# Patient Record
Sex: FEMALE | Race: WHITE | NOT HISPANIC OR LATINO | Employment: FULL TIME | RURAL
[De-identification: names, ages, dates, MRNs, and addresses within clinical notes are randomized per-mention and may not be internally consistent; named-entity substitution may affect disease eponyms.]

---

## 2019-01-10 ENCOUNTER — HISTORICAL (OUTPATIENT)
Dept: ADMINISTRATIVE | Facility: HOSPITAL | Age: 54
End: 2019-01-10

## 2019-01-14 LAB
LAB AP GENERAL CAT - HISTORICAL: NORMAL
LAB AP INTERPRETATION/RESULT - HISTORICAL: NEGATIVE
LAB AP SPECIMEN ADEQUACY - HISTORICAL: NORMAL
LAB AP SPECIMEN SUBMITTED - HISTORICAL: NORMAL

## 2021-03-08 ENCOUNTER — HISTORICAL (OUTPATIENT)
Dept: ADMINISTRATIVE | Facility: HOSPITAL | Age: 56
End: 2021-03-08

## 2021-03-08 LAB — GLUCOSE SERPL-MCNC: 111 MG/DL (ref 70–105)

## 2021-03-09 LAB

## 2021-08-17 ENCOUNTER — OFFICE VISIT (OUTPATIENT)
Dept: INTERNAL MEDICINE | Facility: CLINIC | Age: 56
End: 2021-08-17
Payer: COMMERCIAL

## 2021-08-17 VITALS
WEIGHT: 231 LBS | SYSTOLIC BLOOD PRESSURE: 92 MMHG | DIASTOLIC BLOOD PRESSURE: 58 MMHG | HEART RATE: 72 BPM | RESPIRATION RATE: 16 BRPM | OXYGEN SATURATION: 98 %

## 2021-08-17 DIAGNOSIS — E78.5 HYPERLIPIDEMIA, UNSPECIFIED HYPERLIPIDEMIA TYPE: ICD-10-CM

## 2021-08-17 DIAGNOSIS — I10 HYPERTENSION, UNSPECIFIED TYPE: ICD-10-CM

## 2021-08-17 DIAGNOSIS — E11.9 CONTROLLED TYPE 2 DIABETES MELLITUS WITHOUT COMPLICATION, WITHOUT LONG-TERM CURRENT USE OF INSULIN: Primary | ICD-10-CM

## 2021-08-17 PROCEDURE — 99213 OFFICE O/P EST LOW 20 MIN: CPT | Mod: S$PBB,,, | Performed by: NURSE PRACTITIONER

## 2021-08-17 PROCEDURE — 99213 OFFICE O/P EST LOW 20 MIN: CPT | Mod: PBBFAC | Performed by: NURSE PRACTITIONER

## 2021-08-17 PROCEDURE — 99213 PR OFFICE/OUTPT VISIT, EST, LEVL III, 20-29 MIN: ICD-10-PCS | Mod: S$PBB,,, | Performed by: NURSE PRACTITIONER

## 2021-08-17 RX ORDER — LISINOPRIL AND HYDROCHLOROTHIAZIDE 10; 12.5 MG/1; MG/1
TABLET ORAL
COMMUNITY
Start: 2021-08-15 | End: 2021-09-09 | Stop reason: SDUPTHER

## 2021-08-17 RX ORDER — DAPAGLIFLOZIN 10 MG/1
TABLET, FILM COATED ORAL
COMMUNITY
Start: 2021-06-21 | End: 2021-09-09 | Stop reason: SDUPTHER

## 2021-08-17 RX ORDER — SIMVASTATIN 20 MG/1
TABLET, FILM COATED ORAL
COMMUNITY
Start: 2021-08-15 | End: 2021-09-09 | Stop reason: SDUPTHER

## 2021-09-09 ENCOUNTER — OFFICE VISIT (OUTPATIENT)
Dept: DIABETES SERVICES | Facility: CLINIC | Age: 56
End: 2021-09-09
Payer: COMMERCIAL

## 2021-09-09 VITALS
BODY MASS INDEX: 36 KG/M2 | DIASTOLIC BLOOD PRESSURE: 66 MMHG | OXYGEN SATURATION: 98 % | WEIGHT: 229.38 LBS | SYSTOLIC BLOOD PRESSURE: 106 MMHG | HEART RATE: 71 BPM | RESPIRATION RATE: 16 BRPM | HEIGHT: 67 IN

## 2021-09-09 DIAGNOSIS — E11.9 TYPE 2 DIABETES MELLITUS WITHOUT COMPLICATION, WITHOUT LONG-TERM CURRENT USE OF INSULIN: Primary | ICD-10-CM

## 2021-09-09 DIAGNOSIS — E78.5 HYPERLIPIDEMIA, UNSPECIFIED HYPERLIPIDEMIA TYPE: ICD-10-CM

## 2021-09-09 LAB
GLUCOSE SERPL-MCNC: 100 MG/DL (ref 70–110)
HBA1C MFR BLD: 5.1 % (ref 4.5–6.6)

## 2021-09-09 PROCEDURE — 99214 OFFICE O/P EST MOD 30 MIN: CPT | Mod: PBBFAC | Performed by: NURSE PRACTITIONER

## 2021-09-09 PROCEDURE — 99214 OFFICE O/P EST MOD 30 MIN: CPT | Mod: S$PBB,,, | Performed by: NURSE PRACTITIONER

## 2021-09-09 PROCEDURE — 99214 PR OFFICE/OUTPT VISIT, EST, LEVL IV, 30-39 MIN: ICD-10-PCS | Mod: S$PBB,,, | Performed by: NURSE PRACTITIONER

## 2021-09-09 PROCEDURE — 83036 HEMOGLOBIN GLYCOSYLATED A1C: CPT | Mod: PBBFAC | Performed by: NURSE PRACTITIONER

## 2021-09-09 PROCEDURE — 82962 GLUCOSE BLOOD TEST: CPT | Mod: PBBFAC | Performed by: NURSE PRACTITIONER

## 2021-09-09 RX ORDER — DAPAGLIFLOZIN 10 MG/1
10 TABLET, FILM COATED ORAL DAILY
Qty: 90 TABLET | Refills: 3 | Status: SHIPPED | OUTPATIENT
Start: 2021-09-09 | End: 2022-11-02

## 2021-09-09 RX ORDER — SIMVASTATIN 20 MG/1
20 TABLET, FILM COATED ORAL NIGHTLY
Qty: 90 TABLET | Refills: 3 | Status: SHIPPED | OUTPATIENT
Start: 2021-09-09 | End: 2022-02-16 | Stop reason: SDUPTHER

## 2021-09-09 RX ORDER — LISINOPRIL AND HYDROCHLOROTHIAZIDE 10; 12.5 MG/1; MG/1
1 TABLET ORAL DAILY
Qty: 90 TABLET | Refills: 3 | Status: SHIPPED | OUTPATIENT
Start: 2021-09-09 | End: 2022-02-16 | Stop reason: SDUPTHER

## 2022-01-14 ENCOUNTER — OFFICE VISIT (OUTPATIENT)
Dept: FAMILY MEDICINE | Facility: CLINIC | Age: 57
End: 2022-01-14
Payer: COMMERCIAL

## 2022-01-14 VITALS
TEMPERATURE: 99 F | OXYGEN SATURATION: 96 % | SYSTOLIC BLOOD PRESSURE: 110 MMHG | RESPIRATION RATE: 18 BRPM | HEART RATE: 76 BPM | DIASTOLIC BLOOD PRESSURE: 50 MMHG

## 2022-01-14 DIAGNOSIS — R11.0 NAUSEA: ICD-10-CM

## 2022-01-14 DIAGNOSIS — U07.1 COVID-19: ICD-10-CM

## 2022-01-14 DIAGNOSIS — J02.9 SORE THROAT: Primary | ICD-10-CM

## 2022-01-14 LAB
CTP QC/QA: YES
S PYO RRNA THROAT QL PROBE: NEGATIVE

## 2022-01-14 PROCEDURE — 99213 PR OFFICE/OUTPT VISIT, EST, LEVL III, 20-29 MIN: ICD-10-PCS | Mod: ,,, | Performed by: NURSE PRACTITIONER

## 2022-01-14 PROCEDURE — 87880 POCT RAPID STREP A: ICD-10-PCS | Mod: QW,,, | Performed by: NURSE PRACTITIONER

## 2022-01-14 PROCEDURE — 87880 STREP A ASSAY W/OPTIC: CPT | Mod: QW,,, | Performed by: NURSE PRACTITIONER

## 2022-01-14 PROCEDURE — 99213 OFFICE O/P EST LOW 20 MIN: CPT | Mod: ,,, | Performed by: NURSE PRACTITIONER

## 2022-01-14 RX ORDER — ONDANSETRON 4 MG/1
4 TABLET, ORALLY DISINTEGRATING ORAL EVERY 8 HOURS PRN
Qty: 12 TABLET | Refills: 0 | Status: SHIPPED | OUTPATIENT
Start: 2022-01-14 | End: 2022-09-12

## 2022-01-14 NOTE — PATIENT INSTRUCTIONS
Strep negative, will try magic mouthwash for pain zofran for nausea  Childrens ibuprofen for pain      Covid Medication List          Vitamin D 5,000 units twice a day     Zinc 50 mg twice a day     Pepcid 20 mg once a day     Zyrtec 10 mg once a day     Aspirin 325 mg once a day     Vitamin C 1000 mg twice a day     Melatonin 3 mg at bedtime     Increase fluid intake and rest!      Stay home until:    At least 5 days have passed since your symptoms began (or since your positive test, if you have no symptoms),    AND you have no more symptoms, or your symptoms are improving and you have no fever and do not need fever-reducing medication such as Tylenol (acetaminophen) or Advil (ibuprofen).    Remain home after 5 days as long as you have a fever. Remaining at home is important to prevent transmitting infection to others.      The [place] Department of Health ([facility]) recommends the following after  exposure to a COVID-19 infected person:  Any exposed individual who develops symptoms should be tested and stay home.  If you have had a booster shot OR you have been fully vaccinated but are not yet eligible for  a booster because of timing*, you should:  ? Wear a mask around others for 10 days.  ? Test on day 5, if possible.  ? If you develop symptoms get a test and stay home.  *Applies to individuals who completed the primary series of Pfizer or Moderna vaccine within the last 6  months OR completed the primary series of J&J vaccine within the last 2 months  If you are unvaccinated OR are currently eligible for a booster dose but have not yet received  one* you should:  ? Stay home for 5 days. After that continue to wear a mask around others for 5 additional  days.  ? If you cant quarantine you must wear a mask for 10 days.  ? Test on day 5 if possible.  ? If you develop symptoms get a test and stay home  *Applies to individuals who completed the primary series of Pfizer or Moderna vaccine over 6 months  ago  and are not boosted OR completed the primary series of J&J over 2 months ago and are not  boosted  Additional considerations:  ? Please let your provider know you have been exposed if you do go in for testing.  ? If you live in a household with a person who is a confirmed case of COVID-19, your last  exposure is when is when you last had contact less than 6 feet for 15 minutes or more.  ? During the 5 day quarantine at home you may be allowed to continue to work if your employer  says you are essential, and you continue to have no symptoms, undergo symptom and  temperature monitoring by your facility and wear a mask while you are at work and around  others. Contact your employer for approval.  o If you do return to work, you should continue to self-quarantine at home at all other  times.  Official CDC Update on Isolation and Quarantine can be found at  https://www.cdc.gov/media/releases/2021/s1227-isolation-quarantine-guidance.html

## 2022-01-14 NOTE — LETTER
January 14, 2022      [facility] [address] [place] [address]  Phone: [phone]  Fax: [phone]       Patient: Megan Trotter   YOB: 1965  Date of Visit: 01/14/2022    To Whom It May Concern:    Corby Trotter  was at [facility] on 01/14/2022. The patient may return to work/school on 1/19/2022 if fever/symptom free x24 hours without medication, continue to wear a mask 5 days after returning. If you have any questions or concerns, or if I can be of further assistance, please do not hesitate to contact me. Excused starting 1/12/2022.    Sincerely,    Monique AVITIA; Yanci Castellon RN

## 2022-01-14 NOTE — PROGRESS NOTES
2022     SADI Gamboa   [facility]  [address]  [place] [address]     PATIENT NAME: Megan Trotter  : 1965  DATE: 22  MRN: [id number]      Billing Provider: SADI Gamboa  Level of Service:   Patient PCP Information     Provider PCP Type    Primary Doctor No General          Reason for Visit / Chief Complaint: No chief complaint on file.       Update PCP  Update Chief Complaint         History of Present Illness / Problem Focused Workflow     Megan Trotter presents to the clinic sore throat fever tested positive on covid home test but very painful to swallow not eating or drinking well      Review of Systems     Review of Systems   Constitutional: Positive for appetite change, chills, fatigue and fever.   HENT: Positive for nasal congestion, sore throat, trouble swallowing and voice change.    Eyes: Negative for visual disturbance.   Respiratory: Negative for cough and wheezing.    Cardiovascular: Negative for chest pain and palpitations.   Gastrointestinal: Positive for nausea. Negative for abdominal pain, change in bowel habit, diarrhea, vomiting and change in bowel habit.   Genitourinary: Negative for difficulty urinating.   Musculoskeletal: Positive for myalgias.   Integumentary:  Negative for pallor and rash.   Neurological: Positive for weakness and headaches.   Hematological: Negative for adenopathy.   Psychiatric/Behavioral: Negative for confusion.        Medical / Social / Family History     Past Medical History:   Diagnosis Date    Diabetes mellitus, type 2     Hyperlipidemia        Past Surgical History:   Procedure Laterality Date    breast biopsy       SECTION      exploratory laprascopic      HERNIA REPAIR      HYSTERECTOMY         Social History  Ms.  reports that she has quit smoking. She has never used smokeless tobacco. She reports previous alcohol use. She reports that she does not use drugs.    Family  History  Ms.'s family history includes Asthma in her mother; Heart disease in her mother; Hypertension in her father; No Known Problems in her sister and sister.    Medications and Allergies     Medications  No outpatient medications have been marked as taking for the 1/14/22 encounter (Appointment) with SADI Gamboa.       Allergies  Review of patient's allergies indicates:   Allergen Reactions    Pcn [penicillins]        Physical Examination   There were no vitals filed for this visit.   Physical Exam  Vitals and nursing note reviewed.   Constitutional:       General: She is not in acute distress.     Appearance: Normal appearance. She is obese. She is not ill-appearing, toxic-appearing or diaphoretic.   HENT:      Head: Normocephalic.      Right Ear: External ear normal.      Left Ear: External ear normal.      Nose: Rhinorrhea present.      Mouth/Throat:      Mouth: Mucous membranes are moist.      Pharynx: No oropharyngeal exudate or posterior oropharyngeal erythema.      Comments: Hoarse voice  Eyes:      Pupils: Pupils are equal, round, and reactive to light.      Comments: injected   Cardiovascular:      Rate and Rhythm: Normal rate and regular rhythm.      Heart sounds: Normal heart sounds.   Pulmonary:      Effort: Pulmonary effort is normal.      Breath sounds: Normal breath sounds.   Musculoskeletal:      Cervical back: Normal range of motion and neck supple. No rigidity or tenderness.   Lymphadenopathy:      Cervical: No cervical adenopathy.   Skin:     General: Skin is warm.      Capillary Refill: Capillary refill takes less than 2 seconds.   Neurological:      Mental Status: She is alert and oriented to person, place, and time.   Psychiatric:         Behavior: Behavior normal.          Assessment and Plan (including Health Maintenance)      Problem List  Smart Sets  Document Outside HM   :    Plan:   Strep negative, will try magic mouthwash for pain zofran for nausea  Childrens  ibuprofen for pain      Covid Medication List          Vitamin D 5,000 units twice a day     Zinc 50 mg twice a day     Pepcid 20 mg once a day     Zyrtec 10 mg once a day     Aspirin 325 mg once a day     Vitamin C 1000 mg twice a day     Melatonin 3 mg at bedtime     Increase fluid intake and rest!      Stay home until:    At least 5 days have passed since your symptoms began (or since your positive test, if you have no symptoms),    AND you have no more symptoms, or your symptoms are improving and you have no fever and do not need fever-reducing medication such as Tylenol (acetaminophen) or Advil (ibuprofen).    Remain home after 5 days as long as you have a fever. Remaining at home is important to prevent transmitting infection to others.      The [place] Department of Health ([facility]) recommends the following after  exposure to a COVID-19 infected person:  Any exposed individual who develops symptoms should be tested and stay home.  If you have had a booster shot OR you have been fully vaccinated but are not yet eligible for  a booster because of timing*, you should:  ? Wear a mask around others for 10 days.  ? Test on day 5, if possible.  ? If you develop symptoms get a test and stay home.  *Applies to individuals who completed the primary series of Pfizer or Moderna vaccine within the last 6  months OR completed the primary series of J&J vaccine within the last 2 months  If you are unvaccinated OR are currently eligible for a booster dose but have not yet received  one* you should:  ? Stay home for 5 days. After that continue to wear a mask around others for 5 additional  days.  ? If you cant quarantine you must wear a mask for 10 days.  ? Test on day 5 if possible.  ? If you develop symptoms get a test and stay home  *Applies to individuals who completed the primary series of Pfizer or Moderna vaccine over 6 months  ago and are not boosted OR completed the primary series of J&J over 2 months ago and  are not  boosted  Additional considerations:  ? Please let your provider know you have been exposed if you do go in for testing.  ? If you live in a household with a person who is a confirmed case of COVID-19, your last  exposure is when is when you last had contact less than 6 feet for 15 minutes or more.  ? During the 5 day quarantine at home you may be allowed to continue to work if your employer  says you are essential, and you continue to have no symptoms, undergo symptom and  temperature monitoring by your facility and wear a mask while you are at work and around  others. Contact your employer for approval.  o If you do return to work, you should continue to self-quarantine at home at all other  times.  Official CDC Update on Isolation and Quarantine can be found at  https://www.cdc.gov/media/releases/2021/s1227-isolation-quarantine-guidance.html        Health Maintenance Due   Topic Date Due    Hepatitis C Screening  Never done    COVID-19 Vaccine (1) Never done    Pneumococcal Vaccines (Age 0-64) (1 of 2 - PPSV23) Never done    Foot Exam  Never done    Eye Exam  Never done    HIV Screening  Never done    TETANUS VACCINE  Never done    Mammogram  Never done    Shingles Vaccine (1 of 2) Never done    Influenza Vaccine (1) Never done       Problem List Items Addressed This Visit    None       There are no diagnoses linked to this encounter.   Health Maintenance Topics with due status: Not Due       Topic Last Completion Date    Colorectal Cancer Screening 08/03/2015    Lipid Panel 08/27/2021    Low Dose Statin 09/09/2021    Diabetes Urine Screening 09/09/2021    Hemoglobin A1c 09/09/2021       Procedures     Future Appointments   Date Time Provider Department Center   2/16/2022  8:30 AM Erin Pitts NP [facility]   3/9/2022  9:45 [facility]        No follow-ups on file.     Signature:  SADI Gamboa    Date of encounter: 1/14/22

## 2022-02-16 ENCOUNTER — OFFICE VISIT (OUTPATIENT)
Dept: INTERNAL MEDICINE | Facility: CLINIC | Age: 57
End: 2022-02-16
Payer: COMMERCIAL

## 2022-02-16 VITALS
WEIGHT: 222.19 LBS | SYSTOLIC BLOOD PRESSURE: 108 MMHG | OXYGEN SATURATION: 99 % | DIASTOLIC BLOOD PRESSURE: 70 MMHG | RESPIRATION RATE: 16 BRPM | BODY MASS INDEX: 34.8 KG/M2 | HEART RATE: 70 BPM

## 2022-02-16 DIAGNOSIS — I10 HYPERTENSION, UNSPECIFIED TYPE: Primary | ICD-10-CM

## 2022-02-16 DIAGNOSIS — E78.5 HYPERLIPIDEMIA, UNSPECIFIED HYPERLIPIDEMIA TYPE: ICD-10-CM

## 2022-02-16 PROCEDURE — 99213 PR OFFICE/OUTPT VISIT, EST, LEVL III, 20-29 MIN: ICD-10-PCS | Mod: S$PBB,,, | Performed by: NURSE PRACTITIONER

## 2022-02-16 PROCEDURE — 99213 OFFICE O/P EST LOW 20 MIN: CPT | Mod: S$PBB,,, | Performed by: NURSE PRACTITIONER

## 2022-02-16 PROCEDURE — 99214 OFFICE O/P EST MOD 30 MIN: CPT | Mod: PBBFAC | Performed by: NURSE PRACTITIONER

## 2022-02-16 RX ORDER — LISINOPRIL AND HYDROCHLOROTHIAZIDE 10; 12.5 MG/1; MG/1
1 TABLET ORAL DAILY
Qty: 90 TABLET | Refills: 3 | Status: SHIPPED | OUTPATIENT
Start: 2022-02-16 | End: 2022-08-17 | Stop reason: SDUPTHER

## 2022-02-16 RX ORDER — SIMVASTATIN 20 MG/1
20 TABLET, FILM COATED ORAL NIGHTLY
Qty: 90 TABLET | Refills: 3 | Status: SHIPPED | OUTPATIENT
Start: 2022-02-16 | End: 2022-08-17 | Stop reason: SDUPTHER

## 2022-02-16 NOTE — PROGRESS NOTES
HPI/CC  Ms Megan Trotter is a 56 year old  female who returns for management of chronic conditions.   She has seen Ms. Marinasaima Davila, MARSHALLP, for care of diabetes. Has had weight loss and decreased her HgbA1c has decreased since she started Farxiga and Rybelsus.  Ms. Davila will monitor labs related to diabetes.  Most recent A1c:  5.1  Pt has purchased  BP monitoring device and will notify me if blood pressures remain <100 or <70.  Pt tested positive for COVID-19 last month; was treated as outpatient by Ms. Hernandez.  States she has recovered.     Health Maintenance:  Pt confirms that she had screening colonoscope in 2015.  States she will contact gastroenterology about follow-up.  Pt will schedule mammogram this year.  Pt is former smoker.    Pt is a non-drinker.  Has had two COVID-19 Vaccinations and complies with recommendations to reduce risk of transmission.     Review of Systems   Constitutional: Negative for fever, chills, malaise.  Intentional weight loss.  Respiratory: Negative for cough and shortness of breath.    Cardiovascular: Negative for chest pain and palpitations.   Gastrointestinal: Negative for change in bowel habit  Neurological: Negative for dizziness, syncope, weakness and light-headedness.      Physical Exam  Constitutional:       Appearance: In no distress  HENT:      Mouth/Throat:      Wearing mask.     No cervical lymphadenopathy  Eyes:      Conjunctiva/sclera: Conjunctivae normal. Pupils equal  Cardiovascular:      Rate and Rhythm: Normal rate and regular rhythm.   Pulmonary:      Effort: Pulmonary effort is normal. No wheeze, rales, rhonchi  Musculoskeletal:      Gait normal  Skin:     General: Skin is warm and dry. No rashes  Neurological:      Mental Status: She is alert and oriented to person, place, and time.      Assessment:     Type 2 Diabetes Mellitus, controlled    Hypertension, controlled    Hyperlipidemia            Plan:    1.  Reviewed recent lab results with pt.   2.  Pt  will schedule mammogram.   3.  Lab: CMP    4.  Keep appt with Ms. Davila.   5.  Refilled Lisinopril/ HCTZ and Simvastin   4.  Lipid profile and CBC ordered in November   5.  Instructed to monitor blood pressure and notify provider of low readings (<100, <70)   6.  Return 6 mo

## 2022-03-09 ENCOUNTER — OFFICE VISIT (OUTPATIENT)
Dept: DIABETES SERVICES | Facility: CLINIC | Age: 57
End: 2022-03-09
Payer: COMMERCIAL

## 2022-03-09 VITALS
DIASTOLIC BLOOD PRESSURE: 60 MMHG | HEART RATE: 69 BPM | OXYGEN SATURATION: 98 % | WEIGHT: 222 LBS | SYSTOLIC BLOOD PRESSURE: 110 MMHG | HEIGHT: 67 IN | RESPIRATION RATE: 16 BRPM | BODY MASS INDEX: 34.84 KG/M2

## 2022-03-09 DIAGNOSIS — E78.5 HYPERLIPIDEMIA, UNSPECIFIED HYPERLIPIDEMIA TYPE: ICD-10-CM

## 2022-03-09 DIAGNOSIS — E11.9 TYPE 2 DIABETES MELLITUS WITHOUT COMPLICATION, WITHOUT LONG-TERM CURRENT USE OF INSULIN: Primary | ICD-10-CM

## 2022-03-09 LAB
GLUCOSE SERPL-MCNC: 97 MG/DL (ref 70–110)
HBA1C MFR BLD: 5.1 % (ref 4.5–6.6)

## 2022-03-09 PROCEDURE — 99214 OFFICE O/P EST MOD 30 MIN: CPT | Mod: S$PBB,,, | Performed by: NURSE PRACTITIONER

## 2022-03-09 PROCEDURE — 82962 GLUCOSE BLOOD TEST: CPT | Mod: PBBFAC | Performed by: NURSE PRACTITIONER

## 2022-03-09 PROCEDURE — 99214 OFFICE O/P EST MOD 30 MIN: CPT | Mod: PBBFAC | Performed by: NURSE PRACTITIONER

## 2022-03-09 PROCEDURE — 99214 PR OFFICE/OUTPT VISIT, EST, LEVL IV, 30-39 MIN: ICD-10-PCS | Mod: S$PBB,,, | Performed by: NURSE PRACTITIONER

## 2022-03-09 PROCEDURE — 83036 HEMOGLOBIN GLYCOSYLATED A1C: CPT | Mod: PBBFAC | Performed by: NURSE PRACTITIONER

## 2022-03-09 NOTE — PROGRESS NOTES
Subjective:       Patient ID: Megan Trotter is a 57 y.o. female.    Chief Complaint: Diabetes Mellitus (Pt here for follow up visit and A1c, verbalizes no complaints, checks sugar 1-2 x week.)    She is here today for routine evaluation and med refill.  Over all she has lost 40 pounds since early last year, has remained the same since September. Doing well on rybelsus and farxiga. No complaints.  She is not exercising.     Review of Systems   Constitutional: Negative for activity change, appetite change, diaphoresis and fatigue.   HENT: Negative for nasal congestion, facial swelling and sinus pressure/congestion.    Eyes: Negative for visual disturbance.   Respiratory: Negative for shortness of breath and wheezing.    Cardiovascular: Negative for chest pain and leg swelling.   Gastrointestinal: Negative for constipation, diarrhea, nausea and vomiting.   Endocrine: Negative for polydipsia, polyphagia and polyuria.   Genitourinary: Negative for dysuria, frequency and urgency.   Musculoskeletal: Negative for gait problem and myalgias.   Integumentary:  Negative for color change, rash and wound.   Neurological: Negative for dizziness, syncope, weakness, headaches, disturbances in coordination and coordination difficulties.   Hematological: Does not bruise/bleed easily.   Psychiatric/Behavioral: Negative for self-injury, sleep disturbance and suicidal ideas. The patient is not nervous/anxious.          Objective:      Physical Exam  Vitals and nursing note reviewed.   Constitutional:       Appearance: Normal appearance.   HENT:      Head: Normocephalic.   Cardiovascular:      Rate and Rhythm: Normal rate.      Pulses:           Dorsalis pedis pulses are 3+ on the right side and 3+ on the left side.        Posterior tibial pulses are 3+ on the right side and 3+ on the left side.   Pulmonary:      Effort: Pulmonary effort is normal.   Musculoskeletal:         General: Normal range of motion.      Right foot: Normal range  of motion. No deformity.      Left foot: Normal range of motion. No deformity.   Feet:      Right foot:      Skin integrity: Skin integrity normal. No ulcer or callus.      Toenail Condition: Right toenails are normal.      Left foot:      Skin integrity: Skin integrity normal. No ulcer or callus.      Toenail Condition: Left toenails are normal.   Skin:     General: Skin is warm and dry.   Neurological:      General: No focal deficit present.      Mental Status: She is alert and oriented to person, place, and time.   Psychiatric:         Mood and Affect: Mood normal.         Behavior: Behavior normal.         Thought Content: Thought content normal.         Judgment: Judgment normal.         Assessment:       Problem List Items Addressed This Visit        Cardiac/Vascular    Hyperlipidemia       Endocrine    Diabetes mellitus, type 2 - Primary    Relevant Orders    Hemoglobin A1C, POCT (Completed)    POCT Glucose, Hand-Held Device (Completed)          Plan:       Problem List Items Addressed This Visit        Cardiac/Vascular    Hyperlipidemia       Endocrine    Diabetes mellitus, type 2 - Primary    Relevant Orders    Hemoglobin A1C, POCT (Completed)    POCT Glucose, Hand-Held Device (Completed)        She is going work on diet and exercise, we may change rybelsus to ozempic at the next visit.   Check glucose daily alternating fasing and 2 hours pp.

## 2022-03-09 NOTE — PATIENT INSTRUCTIONS
Snacks with 0-5 grams carbs   Hard Boiled Egg   Crystal Light, Vitamin Water, Powerade Zero   Herbal tea, unsweetened   8 oz unsweetened almond milk   2 tsp peanut butter on celery   ½ cup sugar-free Jell-O   1 sugar-free popsicle   Non starchy vegetables such as carrots or celery sticks with lowfat dressing   ½ oz lowfat cheese or string cheese   1 closed handful of nuts or tbsp of seeds, unsalted    Snacks with 15 gram carbs  . 1 small piece of fruit or . banana or . cup light canned fruit  . 3 yaneli cracker squares  . 3 cups popcorn  . 5 Vanilla Wafers  . 1/2 cup low fat, no added sugar ice cream or frozen yogurt  . 1/2 turkey, ham, or chicken sandwich  . 1.2 cup fruit with 1/2 cup of cottage cheese  . 4-6 unsalted wheat crackers with 1 oz low fat cheese or 1 tbsp peanut butter  . 30 goldfish crackers  . 7-8 mini rice cakes  . 1/3 cup hummus dip with raw vegetables  . 1/2 whole wheat ольга, 1 tbsp hummus  . Mini pizza (. whole wheat English muffin, low-fat cheese, tomato sauce)  . 100 calorie snack pack  . 4-6 oz light yogurt  . 1/2 cup sugar-free pudding

## 2022-08-17 ENCOUNTER — OFFICE VISIT (OUTPATIENT)
Dept: INTERNAL MEDICINE | Facility: CLINIC | Age: 57
End: 2022-08-17
Payer: COMMERCIAL

## 2022-08-17 VITALS
HEART RATE: 63 BPM | WEIGHT: 217 LBS | OXYGEN SATURATION: 98 % | HEIGHT: 67 IN | RESPIRATION RATE: 16 BRPM | DIASTOLIC BLOOD PRESSURE: 62 MMHG | SYSTOLIC BLOOD PRESSURE: 102 MMHG | BODY MASS INDEX: 34.06 KG/M2

## 2022-08-17 DIAGNOSIS — I10 HYPERTENSION, UNSPECIFIED TYPE: ICD-10-CM

## 2022-08-17 DIAGNOSIS — E78.5 HYPERLIPIDEMIA, UNSPECIFIED HYPERLIPIDEMIA TYPE: ICD-10-CM

## 2022-08-17 PROCEDURE — 99213 PR OFFICE/OUTPT VISIT, EST, LEVL III, 20-29 MIN: ICD-10-PCS | Mod: S$PBB,,, | Performed by: NURSE PRACTITIONER

## 2022-08-17 PROCEDURE — 99214 OFFICE O/P EST MOD 30 MIN: CPT | Mod: PBBFAC | Performed by: NURSE PRACTITIONER

## 2022-08-17 PROCEDURE — 99213 OFFICE O/P EST LOW 20 MIN: CPT | Mod: S$PBB,,, | Performed by: NURSE PRACTITIONER

## 2022-08-17 RX ORDER — SIMVASTATIN 20 MG/1
20 TABLET, FILM COATED ORAL NIGHTLY
Qty: 90 TABLET | Refills: 3 | Status: SHIPPED | OUTPATIENT
Start: 2022-08-17 | End: 2023-06-13 | Stop reason: SDUPTHER

## 2022-08-17 RX ORDER — LISINOPRIL AND HYDROCHLOROTHIAZIDE 10; 12.5 MG/1; MG/1
1 TABLET ORAL DAILY
Qty: 90 TABLET | Refills: 3 | Status: SHIPPED | OUTPATIENT
Start: 2022-08-17 | End: 2023-06-13 | Stop reason: SDUPTHER

## 2022-08-17 NOTE — PROGRESS NOTES
HPI/CC  Ms Megan Trotter is a 57 year old  female who returns for management of chronic conditions.   Continues to see Diabetes Specialist, Ms. MarinaSADI Lainez, for care of diabetes. Has had weight loss and decreased her HgbA1c has decreased since she started Farxiga and Rybelsus.  Ms. Davila will monitor labs related to diabetes.  Pt states she tested positive for COVID in January and recovered without known complications.  Lab results reviewed:  I reviewed lab reports dated 5/16/2022.  eGFR 50; LDL 73   Lab dated 3/9/2022  HgbA1c 5.1    Health Maintenance:  Eye Exam:  States has had eye exam by Dr. Aviles this year.  No evidence of retinopathy  Colon Cancer Screening Pt confirms that she had screening colonoscope in 2015.  States she will contact gastroenterology about follow-up.  Mammograms:  Has not had screening mammograms.  She will make appointmentt with Imaging and phone Nurse James with date of appointment  Tobacco:  Pt is former smoker.    Alcohol:  Pt is a non-drinker.  Vaccinations: Has had two COVID-19 Vaccinations   I reviewed recommendations for shingles and pneumococcal vaccinations; pt states she is not interested in receiving these vaccinations at this time     Review of Systems   Constitutional: Negative for fever, chills, malaise. Positive for intentional weight loss.  Respiratory: Negative for cough and shortness of breath.    Cardiovascular: Negative for chest pain and palpitations. Negative for lower extremity edema  Gastrointestinal: Negative for change in bowel habit  Neurological: Negative for dizziness, syncope, weakness and light-headedness.      Physical Exam  Constitutional:       Appearance: In no distress  HENT:      Mouth/Throat:      Wearing mask.  Eyes:      Conjunctiva/sclera: Conjunctivae normal. Pupils equal  Cardiovascular:      Rate and Rhythm: Normal rate and regular rhythm.   Pulmonary:      Effort: Pulmonary effort is normal. No wheeze, rales, rhonchi  Musculoskeletal:       Gait normal  Skin:     General: Skin is warm and dry. No rashes  Neurological:      Mental Status: She is alert and oriented to person, place, and time.      Assessment:     Type 2 Diabetes Mellitus, controlled    Hypertension, controlled    Hyperlipidemia            Plan:    1.  Reviewed recent lab results with pt.   2.  Reviewed recommended health maintenance screenings.   3.  Refills:  Lisinopril and Zocor   4.  Return 6 mo

## 2022-09-09 ENCOUNTER — HOSPITAL ENCOUNTER (OUTPATIENT)
Dept: RADIOLOGY | Facility: HOSPITAL | Age: 57
Discharge: HOME | End: 2022-09-09
Payer: COMMERCIAL

## 2022-09-09 VITALS — HEIGHT: 67 IN | BODY MASS INDEX: 34.06 KG/M2 | WEIGHT: 217 LBS

## 2022-09-09 DIAGNOSIS — Z12.31 VISIT FOR SCREENING MAMMOGRAM: ICD-10-CM

## 2022-09-09 PROCEDURE — 77067 SCR MAMMO BI INCL CAD: CPT | Mod: TC

## 2022-09-12 ENCOUNTER — OFFICE VISIT (OUTPATIENT)
Dept: DIABETES SERVICES | Facility: CLINIC | Age: 57
End: 2022-09-12
Payer: COMMERCIAL

## 2022-09-12 VITALS
OXYGEN SATURATION: 98 % | HEIGHT: 67 IN | SYSTOLIC BLOOD PRESSURE: 112 MMHG | BODY MASS INDEX: 33.96 KG/M2 | RESPIRATION RATE: 16 BRPM | DIASTOLIC BLOOD PRESSURE: 60 MMHG | WEIGHT: 216.38 LBS | HEART RATE: 67 BPM

## 2022-09-12 DIAGNOSIS — E78.5 HYPERLIPIDEMIA, UNSPECIFIED HYPERLIPIDEMIA TYPE: ICD-10-CM

## 2022-09-12 DIAGNOSIS — E11.9 TYPE 2 DIABETES MELLITUS WITHOUT COMPLICATION, WITHOUT LONG-TERM CURRENT USE OF INSULIN: Primary | ICD-10-CM

## 2022-09-12 LAB
GLUCOSE SERPL-MCNC: 81 MG/DL (ref 70–110)
HBA1C MFR BLD: 5 % (ref 4.5–6.6)

## 2022-09-12 PROCEDURE — 82962 GLUCOSE BLOOD TEST: CPT | Mod: PBBFAC | Performed by: NURSE PRACTITIONER

## 2022-09-12 PROCEDURE — 99213 PR OFFICE/OUTPT VISIT, EST, LEVL III, 20-29 MIN: ICD-10-PCS | Mod: S$PBB,,, | Performed by: NURSE PRACTITIONER

## 2022-09-12 PROCEDURE — 99213 OFFICE O/P EST LOW 20 MIN: CPT | Mod: S$PBB,,, | Performed by: NURSE PRACTITIONER

## 2022-09-12 PROCEDURE — 83036 HEMOGLOBIN GLYCOSYLATED A1C: CPT | Mod: PBBFAC | Performed by: NURSE PRACTITIONER

## 2022-09-12 PROCEDURE — 99214 OFFICE O/P EST MOD 30 MIN: CPT | Mod: PBBFAC | Performed by: NURSE PRACTITIONER

## 2022-09-12 RX ORDER — TIRZEPATIDE 5 MG/.5ML
5 INJECTION, SOLUTION SUBCUTANEOUS
Qty: 12 PEN | Refills: 1 | Status: SHIPPED | OUTPATIENT
Start: 2022-09-12 | End: 2022-09-29

## 2022-09-12 NOTE — PATIENT INSTRUCTIONS
Stop rybelsus and start mounjaro 5 mg once weekly.   Monitor and document glucose daily alternating between fasting and two hours pp and bring in meter to next visit.    Exercise 4-5 times per week.    Work to obtain normal weight.   Take all medications as directed.  Snacks with 0-5 grams carbs   Hard Boiled Egg   Crystal Light, Vitamin Water, Powerade Zero   Herbal tea, unsweetened   8 oz unsweetened almond milk   2 tsp peanut butter on celery   ½ cup sugar-free Jell-O   1 sugar-free popsicle   Non starchy vegetables such as carrots or celery sticks with lowfat dressing   ½ oz lowfat cheese or string cheese   1 closed handful of nuts or tbsp of seeds, unsalted    Snacks with 15 gram carbs  . 1 small piece of fruit or . banana or . cup light canned fruit  . 3 yaneli cracker squares  . 3 cups popcorn  . 5 Vanilla Wafers  . 1/2 cup low fat, no added sugar ice cream or frozen yogurt  . 1/2 turkey, ham, or chicken sandwich  . 1.2 cup fruit with 1/2 cup of cottage cheese  . 4-6 unsalted wheat crackers with 1 oz low fat cheese or 1 tbsp peanut butter  . 30 goldfish crackers  . 7-8 mini rice cakes  . 1/3 cup hummus dip with raw vegetables  . 1/2 whole wheat ольга, 1 tbsp hummus  . Mini pizza (. whole wheat English muffin, low-fat cheese, tomato sauce)  . 100 calorie snack pack  . 4-6 oz light yogurt  . 1/2 cup sugar-free pudding

## 2022-09-12 NOTE — PROGRESS NOTES
Subjective:       Patient ID: Megan Trotter is a 57 y.o. female.    Chief Complaint: Diabetes Mellitus (Pt here for follow up visit and A1c, denies problems, checks sugar 1 x week.)    Here today for routine evaluation.  Has lost 6 pounds since last visit but is interested in changing rybelsus to mounjaro.  Hemoglobin A1C       Date                     Value               Ref Range           Status                09/12/2022               5.0                 4.5 - 6.6 %         Final                 03/09/2022               5.1                 4.5 - 6.6 %         Final                 09/09/2021               5.1                 4.5 - 6.6 %         Final            ----------  Lab Results       Component                Value               Date                       MICROALBUR               1.1                 09/09/2021            Lab Results       Component                Value               Date                       CHOL                     159                 05/16/2022                 CHOL                     137                 08/27/2021            Lab Results       Component                Value               Date                       HDL                      61 (H)              05/16/2022                 HDL                      61 (H)              08/27/2021            Lab Results       Component                Value               Date                       LDLCALC                  73                  05/16/2022                 LDLCALC                  52                  08/27/2021            Lab Results       Component                Value               Date                       TRIG                     124                 05/16/2022                 TRIG                     120                 08/27/2021            Lab Results       Component                Value               Date                       CHOLHDL                  2.6                 05/16/2022                 CHOLHDL                  2.2                  08/27/2021            CMP  Sodium       Date                     Value               Ref Range           Status                05/16/2022               142                 136 - 145 mmol*     Final            ----------  Potassium       Date                     Value               Ref Range           Status                05/16/2022               4.4                 3.5 - 5.1 mmol*     Final            ----------  Chloride       Date                     Value               Ref Range           Status                05/16/2022               108 (H)             98 - 107 mmol/L     Final            ----------  CO2       Date                     Value               Ref Range           Status                05/16/2022               31                  21 - 32 mmol/L      Final            ----------  Glucose       Date                     Value               Ref Range           Status                05/16/2022               113 (H)             74 - 106 mg/dL      Final            ----------  BUN       Date                     Value               Ref Range           Status                05/16/2022               18                  7 - 18 mg/dL        Final            ----------  Creatinine       Date                     Value               Ref Range           Status                05/16/2022               1.17 (H)            0.55 - 1.02 mg*     Final            ----------  Calcium       Date                     Value               Ref Range           Status                05/16/2022               8.7                 8.5 - 10.1 mg/*     Final            ----------  Total Protein       Date                     Value               Ref Range           Status                05/16/2022               7.0                 6.4 - 8.2 g/dL      Final            ----------  Albumin       Date                     Value               Ref Range           Status                05/16/2022               3.9                  3.5 - 5.0 g/dL      Final            ----------  Bilirubin, Total       Date                     Value               Ref Range           Status                05/16/2022               0.6                 0.0 - 1.2 mg/dL     Final            ----------  Alk Phos       Date                     Value               Ref Range           Status                05/16/2022               79                  46 - 118 U/L        Final            ----------  AST       Date                     Value               Ref Range           Status                05/16/2022               20                  15 - 37 U/L         Final            ----------  ALT       Date                     Value               Ref Range           Status                05/16/2022               32                  13 - 56 U/L         Final            ----------  Anion Gap       Date                     Value               Ref Range           Status                05/16/2022               7                   7 - 16 mmol/L       Final            ----------  eGFR       Date                     Value               Ref Range           Status                05/16/2022               50 (L)              >=60 mL/min/1.*     Final            ----------    Review of Systems   Constitutional:  Negative for activity change, appetite change, diaphoresis and fatigue.   HENT:  Negative for nasal congestion, facial swelling and sinus pressure/congestion.    Eyes:  Negative for visual disturbance.   Respiratory:  Negative for shortness of breath and wheezing.    Cardiovascular:  Negative for chest pain and leg swelling.   Gastrointestinal:  Negative for constipation, diarrhea, nausea and vomiting.   Endocrine: Negative for polydipsia, polyphagia and polyuria.   Genitourinary:  Negative for dysuria, frequency and urgency.   Musculoskeletal:  Negative for gait problem and myalgias.   Integumentary:  Negative for color change, rash and wound.   Neurological:  Negative for  dizziness, syncope, weakness, headaches, coordination difficulties and coordination difficulties.   Hematological:  Does not bruise/bleed easily.   Psychiatric/Behavioral:  Negative for self-injury, sleep disturbance and suicidal ideas. The patient is not nervous/anxious.        Objective:      Physical Exam  Vitals and nursing note reviewed.   Constitutional:       Appearance: Normal appearance.   HENT:      Head: Normocephalic.   Cardiovascular:      Rate and Rhythm: Normal rate and regular rhythm.      Pulses:           Dorsalis pedis pulses are 3+ on the right side and 3+ on the left side.        Posterior tibial pulses are 3+ on the right side and 3+ on the left side.      Heart sounds: Normal heart sounds.   Pulmonary:      Effort: Pulmonary effort is normal.      Breath sounds: Normal breath sounds.   Musculoskeletal:         General: Normal range of motion.      Right foot: Normal range of motion. No deformity.      Left foot: Normal range of motion. No deformity.   Feet:      Right foot:      Skin integrity: Skin integrity normal. No ulcer or callus.      Toenail Condition: Right toenails are normal.      Left foot:      Skin integrity: Skin integrity normal. No ulcer or callus.      Toenail Condition: Left toenails are normal.   Skin:     General: Skin is warm and dry.   Neurological:      General: No focal deficit present.      Mental Status: She is alert and oriented to person, place, and time.   Psychiatric:         Mood and Affect: Mood normal.         Behavior: Behavior normal.         Thought Content: Thought content normal.         Judgment: Judgment normal.       Assessment:       Problem List Items Addressed This Visit          Cardiac/Vascular    Hyperlipidemia       Endocrine    Diabetes mellitus, type 2 - Primary    Relevant Orders    Hemoglobin A1C, POCT (Completed)    POCT Glucose, Hand-Held Device (Completed)         Plan:       Problem List Items Addressed This Visit           Cardiac/Vascular    Hyperlipidemia       Endocrine    Diabetes mellitus, type 2 - Primary    Relevant Orders    Hemoglobin A1C, POCT (Completed)    POCT Glucose, Hand-Held Device (Completed)     Stop rybelsus and start mounjaro 5 mg once weekly.  Continue farxiga and ACE and Statin.

## 2022-09-29 RX ORDER — TIRZEPATIDE 5 MG/.5ML
5 INJECTION, SOLUTION SUBCUTANEOUS
Qty: 12 PEN | Refills: 1 | OUTPATIENT
Start: 2022-09-29

## 2022-12-15 ENCOUNTER — OFFICE VISIT (OUTPATIENT)
Dept: DIABETES SERVICES | Facility: CLINIC | Age: 57
End: 2022-12-15
Payer: COMMERCIAL

## 2022-12-15 VITALS
HEIGHT: 67 IN | OXYGEN SATURATION: 99 % | HEART RATE: 67 BPM | RESPIRATION RATE: 14 BRPM | BODY MASS INDEX: 32.36 KG/M2 | SYSTOLIC BLOOD PRESSURE: 120 MMHG | WEIGHT: 206.19 LBS | DIASTOLIC BLOOD PRESSURE: 76 MMHG

## 2022-12-15 DIAGNOSIS — E11.9 TYPE 2 DIABETES MELLITUS WITHOUT COMPLICATION, WITHOUT LONG-TERM CURRENT USE OF INSULIN: Primary | ICD-10-CM

## 2022-12-15 DIAGNOSIS — E78.5 HYPERLIPIDEMIA, UNSPECIFIED HYPERLIPIDEMIA TYPE: ICD-10-CM

## 2022-12-15 LAB
GLUCOSE SERPL-MCNC: 98 MG/DL (ref 70–110)
HBA1C MFR BLD: 5 % (ref 4.5–6.6)

## 2022-12-15 PROCEDURE — 83036 HEMOGLOBIN GLYCOSYLATED A1C: CPT | Mod: PBBFAC | Performed by: NURSE PRACTITIONER

## 2022-12-15 PROCEDURE — 82962 GLUCOSE BLOOD TEST: CPT | Mod: PBBFAC | Performed by: NURSE PRACTITIONER

## 2022-12-15 PROCEDURE — 99213 OFFICE O/P EST LOW 20 MIN: CPT | Mod: S$PBB,,, | Performed by: NURSE PRACTITIONER

## 2022-12-15 PROCEDURE — 99213 PR OFFICE/OUTPT VISIT, EST, LEVL III, 20-29 MIN: ICD-10-PCS | Mod: S$PBB,,, | Performed by: NURSE PRACTITIONER

## 2022-12-15 PROCEDURE — 99214 OFFICE O/P EST MOD 30 MIN: CPT | Mod: PBBFAC | Performed by: NURSE PRACTITIONER

## 2022-12-15 RX ORDER — TIRZEPATIDE 7.5 MG/.5ML
7.5 INJECTION, SOLUTION SUBCUTANEOUS
Qty: 12 PEN | Refills: 1 | Status: SHIPPED | OUTPATIENT
Start: 2022-12-15 | End: 2023-04-18

## 2022-12-15 NOTE — PATIENT INSTRUCTIONS
Increase mounjaro to 7.5mg once weekly.    Low Carbohydrate snacks/quick meals    Ham and cheese rollups - slice of ham wrapped around a string cheese/cheese slice. (0 gm carb)  Cucumber boats (stuffed with chicken salad or tuna salad - no fruit or sweet pickle in salad) (0 gm carb)  Celery and Peanut Butter (2 stalks with 1 Tbsp PNB = 6 gm carb)  Nuts - mixed (1/4 cup = 6 gm carb)  Sunflower seeds- without shell (1/4 cup = 7 gm carb) In the shell (1 cup = 3.3 gm carb)  Deviled eggs (no sweet pickles) - (0 gm carb)  Hard boiled eggs - (0 gm carb)  Guacamole with raw vegetables (mashed avocado with lime juice and seasoning to taste) (1 cup raw veg = 5 gm carb)  Ranch dressing with raw vegetables -(2 Tbsp Ranch = 2 gm carb, ½ cup raw veggies = 2.5 gm carb  Lasagna rolls- Slice zucchini thinly lengthwise and microwave for 1-2 minutes. Fill with ricotta, parmesan cheese. Roll and top with low carb tomato sauce. (5 rolls = 5 gm carb)  Crust less pizza -pepperoni or Nepalese call topped with cheese and veggies +1 tbsp tomato sauce, microwave (1 gm carb)  Beef jerky - read label for lower carb jerky  Olives - Black (5 olives = 1 gm carb) Green (5 olives = 1 gm carb)  Dill pickles (1 whole dill pickle = 2 gm carb)  Sugar free jell-o (0 gm carb)  [place] Chicken Marinate chicken strips in 2 Tbsp sugar free maple syrup, 1 Tbsp lime juice, 1 Tbsp fresh cilantro. [place] or cook in skillet sprayed with oil. (0 gm carb)  Chicken nachos - Heat oven to 350. Rub 5-6 chicken tenders with 1 Tbsp Aleksander Taco seasoning then drizzle with vegetable oil. Bake at 350 for 3-4 min and then flip and cook 3-4 min. Top with grated cheese, jalopenos, call, green onion. Place back in oven at temp of 425 for 3-4 minutes. Serve with side of 2 Tbsp ranch dressing or sour cream. (3 gm carb)  Egg Mcmuffin: using egg (or egg white for a healthier version) as the bread put one slice of cheese with 1 slice Nepalese call or sausage jenny (1 gm carb per  sandwich)  [facility] Okra - Wash and dry fresh okra. Toss with olive oil, salt and pepper and roast in oven 10-20 minutes. (1 cup = 5 gm carb)  Crispy green beans - Junior 5 lbs fresh green beans. Toss with 1/3 cup melted coconut oil and sprinkle with 4 tsp salt and 1 tsp onion and garlic powder. Bake at 170-175 for 8 hours or dehydrate overnight in . (1 cup = 5 gm carb)  Salt and vinegar zucchini chips - Thinly slice zucchini as thin as possible. In small bowl whisk 2 Tbsp olive oil, 2 Tbsp white vinegar, and 2 tsp sea salt. Add zucchini and dehydrate or bake on 170 for 3-4 hours till crispy. (½ cup = 3 gm carb). Make in large batches and keep in air tight container up to 1 week   Zucchini Aleksander chips - Thinly slice zucchini as thin as possible. Heat oil in fryer to 350 and drop zucchini in hot oil working in batches of about 20 chips at a time. Remove, drain and sprinkle with Aleksander Taco seasoning. (1/2 cup = 3 gm carb). Make in large batches and keep in air tight container up to 1 week.  Aleksander Taco Seasoning - 2 Tbsp chili powder, ½ tsp garlic powder, ½ tsp onion powder, ½ tsp crushed red pepper flakes, ½ tsp dried oregano, 3 tsp ground cumin, 2 tsp sea salt, 2 tsp black pepper. Makes 20 servings (0 gm carb)  [place] milk (1 cup almond milk = 0.3 gm carb)  Cheese chips - Preheat oven 400. On a nonstick baking sheet add cheese slices (Cheddar, Swiss, Provolone, Ronak Tanner), bake 10-12 minutes or until browned. Cool completely before removal. (2 slices = 1 gm carb). Store in air tight container up to 1 week.   Breakfast balls - mix together 2 lbs pork sausage, 1 lb ground beef, 3 eggs, 2 Tbsp dried onion flakes, ½ tsp black pepper, ½ lb sharp cheddar cheese, shredded. Form into 4 dozen 1 balls. Bake on cookie sheet for 25 min at 375. Cool and may be frozen as well individually. (0 gm carb)  Meat muffins - spray muffin tin with cooking spray. Line muffin tin with 1 slice ham. Add 1 raw egg. Top with grated  cheese and salt and pepper. Bake 10-12 min. (0 gm carb)  Pork rinds/Cracklings (0 gm carb)  Gummy Bears - Add 1 packet of Sugar free jello (flavor of your choice), 1 packet unflavored gelatin and 1/3 cup cold water to small sauce pan. Stir well and heat over low till it become liquid and dissolved (2-3 minutes). Pour into mold and refrigerate 30-40 minutes. Add only ¼ cup if you want them more firm. (0 gm carb)  Cheese and meat kabobs (0 gm carb)  Garlic parmesan cheese crisps - Preheat oven to 350. On a nonstick baking sheet, place 1 Tbsp grated parmesan cheese, sprinkle with garlic and basil. Bake about 5 minutes or until outside edges become self brown. Cool completely before removal (5 crisps = 1 gm carb)  Antipasti - Pepperoni, salami, green pepper, jalapeno pepper, mushrooms, onion, black olives, cheddar and provolone cheese cubes. Toss with Italian salad dressing. (1/2 cup = 5 gm carb)  [place] chicken wings - (0 gm carb)  Cheetos- preheat oven to 300. Chop ½ cup freshly shredded cheddar cheese that is frozen and place in  or  and chop into tiny pieces. In a mixing bowl, whip 3 egg whites and 1/8 tsp cream of tartar until VERY stiff peaks form. Sprinkle cheese on top of egg whites and then fold cheese into egg whites very carefully. Place mixture into large ziplock bag and cut hole in corner. Gently squeeze onto greased nonstick cookie pan in cheestos like shapes. Sprinkle with parmesan cheese. Bake for 30 minutes. Turn over off and leave in there for another 30 minutes. (1 cup = 1 gm carb)  Cheesy cauliflower tots - preheat oven to 400. Spray mini muffin tin with nonstick spray. Chop ½ large head of cauliflower into small pieces. Place in microwavable bowl and cover. Microwave 2 minutes. Drain cauliflower. Place in  and pulse till finely chopped. To this add, 1/3 cup grated sharp cheddar, ¼ cup grated parmesan cheese, 2 Tbsp. almond flour, ¼ tsp salt, ½ tsp all purpose  seasoning and 1 egg. Mix well. Place 1 Tbsp of mixture in each mini muffin tin. Bake 15 minutes. Remove and flip, bake another 15 minutes. Makes 24 tots. (10 tots = 5 gm carb)  [facility] protein bars (carbs vary)  Tanner snacks - Preheat oven 350. 1 cup shredded Pepperjack paper. Scoop 1 Tbsp cheese. Place on non stick pan and press slightly flat. Top with 1 slice jalapeno pepper. Bake for 10-12 minutes till brown and crispy. Cool completely before removal. (10 crisps = 1 gm carb)  Snake bite (aka jalapeno poppers) - remove seeds and membrane from jalapenos, fill with cream cheese, wrap in call. Stick a toothpick through to hold call in place. Bake 15-20 min at 400 (4 bites = 2 gm carb)  5 minute PNB mousse - Beat together 4 oz softened cream cheese, 2 Tbsp natural PNB (no sugar added), ½ tsp vanilla extract and 1/3 cup Splenda. Fold in 1 cup Reddiwhip. Place in container of your choice and refrigerate up to 1 week. Top your serving with 1Tbsp Sugar free chocolate syrup. (1/2 cup = 4 gm carb)  BLT - Fill a leaf of nathan lettuce leaf with 1 Tbsp LF pierre, 2 slices call, sliced tomato. Sprinkle salt and pepper. (0 gm carb)  Cinnamon and coconut bombs - in microwave safe bowl, mix 1 cup coconut butter, 1 cup coconut milk (full fat canned, not from box), 1 tsp vanilla extract, ½ tsp nutmeg and cinnamon, 1 tsp stevia powder extract. Melt until combined. Place bowl in fridge until hard enough to roll into 10-12 balls, about 30 minutes. Roll balls in 1 cup shredded coconut. Store in refrigerator (1 ball = 1 gm carb)

## 2022-12-15 NOTE — PROGRESS NOTES
Subjective:       Patient ID: Megan Trotter is a 57 y.o. female.    Chief Complaint: Diabetes Mellitus (Here for fu and A1C  denies any complaints checks sugar once weekly)    Here today for routine evaluation and med refill.  She has lost 10 pounds since last visit.     Review of Systems   Constitutional:  Negative for activity change, appetite change, diaphoresis and fatigue.   HENT:  Negative for nasal congestion, facial swelling and sinus pressure/congestion.    Eyes:  Negative for visual disturbance.   Respiratory:  Negative for shortness of breath and wheezing.    Cardiovascular:  Negative for chest pain and leg swelling.   Gastrointestinal:  Negative for constipation, diarrhea, nausea and vomiting.   Endocrine: Negative for polydipsia, polyphagia and polyuria.   Genitourinary:  Negative for dysuria, frequency and urgency.   Musculoskeletal:  Negative for gait problem and myalgias.   Integumentary:  Negative for color change, rash and wound.   Neurological:  Negative for dizziness, syncope, weakness, headaches, coordination difficulties and coordination difficulties.   Hematological:  Does not bruise/bleed easily.   Psychiatric/Behavioral:  Negative for self-injury, sleep disturbance and suicidal ideas. The patient is not nervous/anxious.        Objective:      Physical Exam  Vitals and nursing note reviewed.   Constitutional:       Appearance: Normal appearance.   HENT:      Head: Normocephalic.   Cardiovascular:      Rate and Rhythm: Normal rate and regular rhythm.      Pulses:           Dorsalis pedis pulses are 3+ on the right side and 3+ on the left side.        Posterior tibial pulses are 3+ on the right side and 3+ on the left side.      Heart sounds: Normal heart sounds.   Pulmonary:      Effort: Pulmonary effort is normal.      Breath sounds: Normal breath sounds.   Musculoskeletal:         General: Normal range of motion.      Right foot: Normal range of motion. No deformity.      Left foot: Normal  range of motion. No deformity.   Feet:      Right foot:      Skin integrity: Skin integrity normal. No ulcer or callus.      Toenail Condition: Right toenails are normal.      Left foot:      Skin integrity: Skin integrity normal. No ulcer or callus.      Toenail Condition: Left toenails are normal.   Skin:     General: Skin is warm and dry.   Neurological:      General: No focal deficit present.      Mental Status: She is alert and oriented to person, place, and time.   Psychiatric:         Mood and Affect: Mood normal.         Behavior: Behavior normal.         Thought Content: Thought content normal.         Judgment: Judgment normal.       Assessment:       Problem List Items Addressed This Visit          Cardiac/Vascular    Hyperlipidemia       Endocrine    Diabetes mellitus, type 2 - Primary    Relevant Orders    Hemoglobin A1C, POCT (Completed)    POCT Glucose, Hand-Held Device (Completed)         Plan:       Problem List Items Addressed This Visit          Cardiac/Vascular    Hyperlipidemia       Endocrine    Diabetes mellitus, type 2 - Primary    Relevant Orders    Hemoglobin A1C, POCT (Completed)    POCT Glucose, Hand-Held Device (Completed)       Increase mounjaro to 7.5 mg once weekly.  lifestyle modifications discussed and encouraged.

## 2023-04-19 LAB
LEFT EYE DM RETINOPATHY: NEGATIVE
RIGHT EYE DM RETINOPATHY: NEGATIVE

## 2023-06-13 DIAGNOSIS — E78.5 HYPERLIPIDEMIA, UNSPECIFIED HYPERLIPIDEMIA TYPE: ICD-10-CM

## 2023-06-13 DIAGNOSIS — I10 HYPERTENSION, UNSPECIFIED TYPE: ICD-10-CM

## 2023-06-13 RX ORDER — DAPAGLIFLOZIN 10 MG/1
10 TABLET, FILM COATED ORAL DAILY
Qty: 90 TABLET | Refills: 3 | Status: SHIPPED | OUTPATIENT
Start: 2023-06-13 | End: 2023-10-30

## 2023-06-13 RX ORDER — SIMVASTATIN 20 MG/1
20 TABLET, FILM COATED ORAL NIGHTLY
Qty: 90 TABLET | Refills: 3 | Status: SHIPPED | OUTPATIENT
Start: 2023-06-13

## 2023-06-13 RX ORDER — LISINOPRIL AND HYDROCHLOROTHIAZIDE 10; 12.5 MG/1; MG/1
1 TABLET ORAL DAILY
Qty: 90 TABLET | Refills: 3 | Status: SHIPPED | OUTPATIENT
Start: 2023-06-13 | End: 2023-10-30

## 2023-07-10 ENCOUNTER — TELEPHONE (OUTPATIENT)
Dept: DIABETES SERVICES | Facility: CLINIC | Age: 58
End: 2023-07-10
Payer: COMMERCIAL

## 2023-07-10 NOTE — TELEPHONE ENCOUNTER
----- Message from Jacque Decker MA sent at 7/10/2023  9:56 AM CDT -----  Pt called and stated that she wanted a refill on her mounjaro, I dont see it on her med list though? In your last chart note, you increased her dosage..    She is now asking if she can increase to the 12.5. I didn't know how to add it back to her med list lol      [facility] [address] for pharmacy

## 2023-09-15 ENCOUNTER — HOSPITAL ENCOUNTER (OUTPATIENT)
Dept: RADIOLOGY | Facility: HOSPITAL | Age: 58
Discharge: HOME | End: 2023-09-15
Attending: OBSTETRICS & GYNECOLOGY
Payer: COMMERCIAL

## 2023-09-15 VITALS — BODY MASS INDEX: 25.92 KG/M2 | HEIGHT: 69 IN | WEIGHT: 175 LBS

## 2023-09-15 DIAGNOSIS — Z12.31 OTHER SCREENING MAMMOGRAM: ICD-10-CM

## 2023-09-15 PROCEDURE — 77067 SCR MAMMO BI INCL CAD: CPT | Mod: TC

## 2023-09-19 ENCOUNTER — PATIENT MESSAGE (OUTPATIENT)
Dept: DIABETES SERVICES | Facility: CLINIC | Age: 58
End: 2023-09-19
Payer: COMMERCIAL

## 2023-10-30 ENCOUNTER — OFFICE VISIT (OUTPATIENT)
Dept: DIABETES SERVICES | Facility: CLINIC | Age: 58
End: 2023-10-30
Payer: COMMERCIAL

## 2023-10-30 VITALS
HEART RATE: 69 BPM | DIASTOLIC BLOOD PRESSURE: 70 MMHG | OXYGEN SATURATION: 98 % | RESPIRATION RATE: 16 BRPM | WEIGHT: 173.38 LBS | BODY MASS INDEX: 25.68 KG/M2 | HEIGHT: 69 IN | SYSTOLIC BLOOD PRESSURE: 100 MMHG

## 2023-10-30 DIAGNOSIS — E78.5 HYPERLIPIDEMIA, UNSPECIFIED HYPERLIPIDEMIA TYPE: ICD-10-CM

## 2023-10-30 DIAGNOSIS — E11.9 TYPE 2 DIABETES MELLITUS WITHOUT COMPLICATION, WITHOUT LONG-TERM CURRENT USE OF INSULIN: Primary | ICD-10-CM

## 2023-10-30 LAB
GLUCOSE SERPL-MCNC: 69 MG/DL (ref 70–110)
HBA1C MFR BLD: 4.5 % (ref 4.5–6.6)

## 2023-10-30 PROCEDURE — 83036 HEMOGLOBIN GLYCOSYLATED A1C: CPT | Mod: PBBFAC | Performed by: NURSE PRACTITIONER

## 2023-10-30 PROCEDURE — 99214 PR OFFICE/OUTPT VISIT, EST, LEVL IV, 30-39 MIN: ICD-10-PCS | Mod: S$PBB,,, | Performed by: NURSE PRACTITIONER

## 2023-10-30 PROCEDURE — 82962 GLUCOSE BLOOD TEST: CPT | Mod: PBBFAC | Performed by: NURSE PRACTITIONER

## 2023-10-30 PROCEDURE — 99214 OFFICE O/P EST MOD 30 MIN: CPT | Mod: S$PBB,,, | Performed by: NURSE PRACTITIONER

## 2023-10-30 PROCEDURE — 99999PBSHW POCT GLUCOSE, HAND-HELD DEVICE: Mod: PBBFAC,,,

## 2023-10-30 PROCEDURE — 99999PBSHW POCT HEMOGLOBIN A1C: ICD-10-PCS | Mod: PBBFAC,,,

## 2023-10-30 PROCEDURE — 99999PBSHW POCT HEMOGLOBIN A1C: Mod: PBBFAC,,,

## 2023-10-30 PROCEDURE — 99214 OFFICE O/P EST MOD 30 MIN: CPT | Mod: PBBFAC | Performed by: NURSE PRACTITIONER

## 2023-10-30 RX ORDER — HYDROCHLOROTHIAZIDE 12.5 MG/1
12.5 TABLET ORAL DAILY
Qty: 90 TABLET | Refills: 1 | Status: SHIPPED | OUTPATIENT
Start: 2023-10-30

## 2023-10-30 NOTE — PROGRESS NOTES
"Subjective     Patient ID: Megan Trotter is a 58 y.o. female.    Chief Complaint: General Diabetes Follow-up (Here for routine follow up and A1C check. Patient does not check sugar daily. )    Here today for routine evaluation and med refill.  She is doing very well. Occasional lows but not often.  Low today.  Blood pressure also low. And request to stop Medicine.  We discussed that ACE is protective of renal function but we will use watchful waiting until next visit.  Will give HCTZ to use as needed only for excess fluid.  Reports that she needs it once or twice a month.      Hemoglobin A1C       Date                     Value               Ref Range           Status                10/30/2023               4.5                 4.5 - 6.6 %         Final                 12/15/2022               5.0                 4.5 - 6.6 %         Final                 09/12/2022               5.0                 4.5 - 6.6 %         Final              Lab Results       Component                Value               Date                       MICROALBUR               1.1                 09/09/2021            Lab Results       Component                Value               Date                       CHOL                     159                 05/16/2022                 CHOL                     137                 08/27/2021            Lab Results       Component                Value               Date                       HDL                      61 (H)              05/16/2022                 HDL                      61 (H)              08/27/2021            Lab Results       Component                Value               Date                       LDLCALC                  73                  05/16/2022                 LDLCALC                  52                  08/27/2021            No results found for: "DLDL"  Lab Results       Component                Value               Date                       TRIG                     124       "           05/16/2022                 TRIG                     120                 08/27/2021              f1 Lab Results       Component                Value               Date                       CHOLHDL                  2.6                 05/16/2022                 CHOLHDL                  2.2                 08/27/2021            CMP  Sodium       Date                     Value               Ref Range           Status                05/16/2022               142                 136 - 145 mmol*     Final            ----------  Potassium       Date                     Value               Ref Range           Status                05/16/2022               4.4                 3.5 - 5.1 mmol*     Final            ----------  Chloride       Date                     Value               Ref Range           Status                05/16/2022               108 (H)             98 - 107 mmol/L     Final            ----------  CO2       Date                     Value               Ref Range           Status                05/16/2022               31                  21 - 32 mmol/L      Final            ----------  Glucose       Date                     Value               Ref Range           Status                05/16/2022               113 (H)             74 - 106 mg/dL      Final            ----------  BUN       Date                     Value               Ref Range           Status                05/16/2022               18                  7 - 18 mg/dL        Final            ----------  Creatinine       Date                     Value               Ref Range           Status                05/16/2022               1.17 (H)            0.55 - 1.02 mg*     Final            ----------  Calcium       Date                     Value               Ref Range           Status                05/16/2022               8.7                 8.5 - 10.1 mg/*     Final            ----------  Total Protein       Date                      Value               Ref Range           Status                05/16/2022               7.0                 6.4 - 8.2 g/dL      Final            ----------  Albumin       Date                     Value               Ref Range           Status                05/16/2022               3.9                 3.5 - 5.0 g/dL      Final            ----------  Bilirubin, Total       Date                     Value               Ref Range           Status                05/16/2022               0.6                 0.0 - 1.2 mg/dL     Final            ----------  Alk Phos       Date                     Value               Ref Range           Status                05/16/2022               79                  46 - 118 U/L        Final            ----------  AST       Date                     Value               Ref Range           Status                05/16/2022               20                  15 - 37 U/L         Final            ----------  ALT       Date                     Value               Ref Range           Status                05/16/2022               32                  13 - 56 U/L         Final            ----------  Anion Gap       Date                     Value               Ref Range           Status                05/16/2022               7                   7 - 16 mmol/L       Final            ----------        Review of Systems   Constitutional:  Negative for activity change, appetite change, diaphoresis and fatigue.   HENT:  Negative for nasal congestion, facial swelling and sinus pressure/congestion.    Eyes:  Negative for visual disturbance.   Respiratory:  Negative for shortness of breath and wheezing.    Cardiovascular:  Negative for chest pain and leg swelling.   Gastrointestinal:  Negative for constipation, diarrhea, nausea and vomiting.   Endocrine: Negative for polydipsia, polyphagia and polyuria.   Genitourinary:  Negative for dysuria, frequency and urgency.   Musculoskeletal:  Negative for gait  problem and myalgias.   Integumentary:  Negative for color change, rash and wound.   Neurological:  Negative for dizziness, syncope, weakness, headaches and coordination difficulties.   Hematological:  Does not bruise/bleed easily.   Psychiatric/Behavioral:  Negative for self-injury, sleep disturbance and suicidal ideas. The patient is not nervous/anxious.           Objective     Physical Exam  Vitals and nursing note reviewed.   Constitutional:       Appearance: Normal appearance.   HENT:      Head: Normocephalic.   Cardiovascular:      Rate and Rhythm: Normal rate.      Heart sounds: Normal heart sounds.   Pulmonary:      Effort: Pulmonary effort is normal.      Breath sounds: Normal breath sounds.   Musculoskeletal:         General: Normal range of motion.   Skin:     General: Skin is warm and dry.   Neurological:      General: No focal deficit present.      Mental Status: She is alert and oriented to person, place, and time.   Psychiatric:         Mood and Affect: Mood normal.         Behavior: Behavior normal.         Thought Content: Thought content normal.         Judgment: Judgment normal.            Assessment and Plan     1. Type 2 diabetes mellitus without complication, without long-term current use of insulin  Stop lisinopril/HCTZ per pt request.  She has lost over 35 pounds and blood pressure is low normal.  A1c is well controlled. And we will hold ACE/ARB at this time.  Stop farxiga    Start HCTZ as needed for excess fluid    Continue zocor and mounjaro.     Call at any time for elevation in blood pressure or glucose.     Establish new PCP  -     Hemoglobin A1C, POCT  -     POCT Glucose, Hand-Held Device  -     Comprehensive Metabolic Panel; Future; Expected date: 10/30/2023  -     Lipid Panel; Future; Expected date: 10/30/2023  -     Microalbumin/Creatinine Ratio, Urine; Future; Expected date: 10/30/2023  -     hydroCHLOROthiazide (HYDRODIURIL) 12.5 MG Tab; Take 1 tablet (12.5 mg total) by mouth once  daily.  Dispense: 90 tablet; Refill: 1  -     tirzepatide 12.5 mg/0.5 mL PnIj; Inject 12.5 mg into the skin every 7 days.  Dispense: 12 pen ; Refill: 1    2. Hyperlipidemia, unspecified hyperlipidemia type  Start zocor

## 2023-10-30 NOTE — PATIENT INSTRUCTIONS
Stop lisinopril/HCTZ  Stop farxiga    Start HCTZ as needed for excess fluid    Continue zocor and mounjaro.     Call at any time for elevation in blood pressure or glucose.     Establish new PCP

## 2023-11-01 ENCOUNTER — PATIENT OUTREACH (OUTPATIENT)
Dept: ADMINISTRATIVE | Facility: HOSPITAL | Age: 58
End: 2023-11-01

## 2023-11-01 NOTE — PROGRESS NOTES
Population Health Chart Review & Patient Outreach Details    Updates Requested / Reviewed:  [x]  Care Team Updated    Health Maintenance Topics Addressed and Outreach Outcomes / Actions Taken:          Diabetic Eye Exam [x]  External Records Requested (Dr. Aviles)                                    O positive

## 2023-11-01 NOTE — LETTER
AUTHORIZATION FOR RELEASE OF   CONFIDENTIAL INFORMATION    Dear Dr. Aviles,    We are seeing Megan Trotter, date of birth 1965, in the clinic at [facility]. Marina Davila FNP is the patient's PCP. Megan Trotter has an outstanding lab/procedure at the time we reviewed her chart. In order to help keep her health information updated, she has authorized us to request the following medical record(s):        (  )  MAMMOGRAM                                      (  )  COLONOSCOPY      (  )  PAP SMEAR                                          (  )  OUTSIDE LAB RESULTS     (  )  DEXA SCAN                                          ( x )  EYE EXAM            (  )  FOOT EXAM                                          (  )  ENTIRE RECORD     (  )  OUTSIDE IMMUNIZATIONS                 (  )  _______________         Please fax records to Oziel Bearden LPN Care Coordinator at [phone].      If you have any questions, please contact Oziel at [phone].           Patient Name: Megan Trotter  : 1965  Patient Phone #: [phone]      Detail Level: Simple Detail Level: Detailed Detail Level: Zone

## 2023-11-03 ENCOUNTER — PATIENT OUTREACH (OUTPATIENT)
Dept: ADMINISTRATIVE | Facility: HOSPITAL | Age: 58
End: 2023-11-03

## 2023-11-03 NOTE — PROGRESS NOTES
Population Health Chart Review & Patient Outreach Details    Updates Requested / Reviewed:  [x]  Care Team Updated    Health Maintenance Topics Addressed and Outreach Outcomes / Actions Taken:          Diabetic Eye Exam [x]  Updated with April 2023 Eye Exam (Dr. Aviles). Care Team and Medical History Updated.

## 2024-01-08 DIAGNOSIS — E11.9 TYPE 2 DIABETES MELLITUS WITHOUT COMPLICATION, WITHOUT LONG-TERM CURRENT USE OF INSULIN: ICD-10-CM

## 2024-01-29 DIAGNOSIS — E11.9 TYPE 2 DIABETES MELLITUS WITHOUT COMPLICATION, WITHOUT LONG-TERM CURRENT USE OF INSULIN: ICD-10-CM

## 2024-02-20 ENCOUNTER — OFFICE VISIT (OUTPATIENT)
Dept: OBSTETRICS AND GYNECOLOGY | Facility: CLINIC | Age: 59
End: 2024-02-20
Payer: COMMERCIAL

## 2024-02-20 VITALS
HEIGHT: 69 IN | BODY MASS INDEX: 25.77 KG/M2 | DIASTOLIC BLOOD PRESSURE: 62 MMHG | WEIGHT: 174 LBS | SYSTOLIC BLOOD PRESSURE: 112 MMHG | HEART RATE: 63 BPM

## 2024-02-20 DIAGNOSIS — Z01.419 WELL WOMAN EXAM WITH ROUTINE GYNECOLOGICAL EXAM: Primary | ICD-10-CM

## 2024-02-20 DIAGNOSIS — Z12.72 SPECIAL SCREENING FOR MALIGNANT NEOPLASMS, VAGINA: ICD-10-CM

## 2024-02-20 PROCEDURE — 88142 CYTOPATH C/V THIN LAYER: CPT | Mod: TC,GCY | Performed by: OBSTETRICS & GYNECOLOGY

## 2024-02-20 PROCEDURE — 87624 HPV HI-RISK TYP POOLED RSLT: CPT | Mod: ,,, | Performed by: CLINICAL MEDICAL LABORATORY

## 2024-02-20 PROCEDURE — 99396 PREV VISIT EST AGE 40-64: CPT | Mod: S$PBB,,, | Performed by: OBSTETRICS & GYNECOLOGY

## 2024-02-20 PROCEDURE — 99213 OFFICE O/P EST LOW 20 MIN: CPT | Mod: PBBFAC | Performed by: OBSTETRICS & GYNECOLOGY

## 2024-02-21 LAB
GH SERPL-MCNC: NORMAL NG/ML
INSULIN SERPL-ACNC: NORMAL U[IU]/ML
LAB AP CLINICAL INFORMATION: NORMAL
LAB AP GYN INTERPRETATION: NEGATIVE
LAB AP PAP DISCLAIMER COMMENTS: NORMAL
RENIN PLAS-CCNC: NORMAL NG/ML/H

## 2024-02-21 NOTE — PROGRESS NOTES
Megan Trotter female  for   Chief Complaint   Patient presents with    Well Woman     No concerns; defers rectal.      OB History          2    Para   2    Term   2            AB        Living             SAB        IAB        Ectopic        Multiple        Live Births                      Past Medical History:   Diagnosis Date    Diabetes mellitus, type 2     Diabetic eye exam 2023    Dr. Sha Aviles [facility]    Hyperlipidemia       Past Surgical History:   Procedure Laterality Date    breast biopsy      BREAST BIOPSY       SECTION      exploratory laprascopic      HERNIA REPAIR      HYSTERECTOMY        Review of patient's allergies indicates:   Allergen Reactions    Pcn [penicillins]              Physical exam:     General Appearance: healthy    Chest:chest clear, no wheezing, rales, normal symmetric air entry, Heart exam - S1, S2 normal, no murmur, no gallop, rate regular    Breast:  normal appearance, no masses or tenderness    Abdomen:Normal, benign.    Pelvic:   Vulva :  Normal vulva  Vagina: normal vagina, no discharge, exudate, lesion, or erythema  Uterus:  cervix is absent; adnexa not palpable    Rectal: not done  Extremity:normal    Skin: normal exam        Assessment:   Problem List Items Addressed This Visit    None  Visit Diagnoses       Well woman exam with routine gynecological exam    -  Primary    Relevant Orders    ThinPrep Pap Test    Special screening for malignant neoplasms, vagina        Relevant Orders    ThinPrep Pap Test             Plan:  A Pap smear was done today.  She would be due for another mammogram in September.  She has had a recent colonoscopy.  The patient requested not to have a rectal exam today.

## 2024-02-24 LAB
HPV 16: NEGATIVE
HPV 18: NEGATIVE
HPV OTHER: NEGATIVE

## 2024-03-04 RX ORDER — TIRZEPATIDE 15 MG/.5ML
15 INJECTION, SOLUTION SUBCUTANEOUS
Qty: 12 PEN | Refills: 3 | Status: SHIPPED | OUTPATIENT
Start: 2024-03-04

## 2024-04-16 DIAGNOSIS — E11.9 TYPE 2 DIABETES MELLITUS WITHOUT COMPLICATION, WITHOUT LONG-TERM CURRENT USE OF INSULIN: ICD-10-CM

## 2024-04-17 RX ORDER — HYDROCHLOROTHIAZIDE 12.5 MG/1
12.5 TABLET ORAL
Qty: 90 TABLET | Refills: 3 | Status: SHIPPED | OUTPATIENT
Start: 2024-04-17

## 2024-08-01 ENCOUNTER — PATIENT MESSAGE (OUTPATIENT)
Dept: DIABETES SERVICES | Facility: CLINIC | Age: 59
End: 2024-08-01
Payer: COMMERCIAL

## 2024-09-16 ENCOUNTER — HOSPITAL ENCOUNTER (OUTPATIENT)
Dept: RADIOLOGY | Facility: HOSPITAL | Age: 59
Discharge: HOME | End: 2024-09-16
Attending: OBSTETRICS & GYNECOLOGY
Payer: COMMERCIAL

## 2024-09-16 VITALS — BODY MASS INDEX: 25.77 KG/M2 | WEIGHT: 174 LBS | HEIGHT: 69 IN

## 2024-09-16 DIAGNOSIS — Z12.31 OTHER SCREENING MAMMOGRAM: ICD-10-CM

## 2024-09-16 PROCEDURE — 77067 SCR MAMMO BI INCL CAD: CPT | Mod: TC

## 2025-04-14 ENCOUNTER — OFFICE VISIT (OUTPATIENT)
Dept: FAMILY MEDICINE | Facility: CLINIC | Age: 60
End: 2025-04-14
Payer: COMMERCIAL

## 2025-04-14 ENCOUNTER — PATIENT MESSAGE (OUTPATIENT)
Dept: FAMILY MEDICINE | Facility: CLINIC | Age: 60
End: 2025-04-14
Payer: COMMERCIAL

## 2025-04-14 ENCOUNTER — RESULTS FOLLOW-UP (OUTPATIENT)
Dept: FAMILY MEDICINE | Facility: CLINIC | Age: 60
End: 2025-04-14
Payer: COMMERCIAL

## 2025-04-14 VITALS
DIASTOLIC BLOOD PRESSURE: 59 MMHG | HEIGHT: 69 IN | RESPIRATION RATE: 18 BRPM | WEIGHT: 174.38 LBS | TEMPERATURE: 98 F | OXYGEN SATURATION: 100 % | BODY MASS INDEX: 25.83 KG/M2 | SYSTOLIC BLOOD PRESSURE: 96 MMHG | HEART RATE: 60 BPM

## 2025-04-14 DIAGNOSIS — M79.672 FOOT PAIN, LEFT: Primary | ICD-10-CM

## 2025-04-14 PROBLEM — I10 ESSENTIAL HYPERTENSION: Status: ACTIVE | Noted: 2024-09-12

## 2025-04-14 PROBLEM — E55.9 VITAMIN D DEFICIENCY: Status: ACTIVE | Noted: 2024-09-12

## 2025-04-14 PROCEDURE — 99213 OFFICE O/P EST LOW 20 MIN: CPT | Mod: ,,, | Performed by: NURSE PRACTITIONER

## 2025-04-14 RX ORDER — DEXTROSE 4 G
TABLET,CHEWABLE ORAL
COMMUNITY
Start: 2024-08-12

## 2025-04-14 NOTE — PATIENT INSTRUCTIONS
Please bring ALL medications bottles (from ALL providers) including over-the-counter medications to your next appointment !!!         Rest your foot.   Place an ice pack or a bag of frozen vegetables wrapped in a towel over the painful part. Never put ice right on the skin. Use ice every 1 to 2 hours for 10 to 15 minutes at a time.   Wrap your foot with an elastic bandage to help with swelling.  Prop your foot on pillows, keeping it raised above the level of your heart. This may help lessen pain and swelling.  Slowly begin to stretch your foot when swelling and pain improves.

## 2025-04-14 NOTE — PROGRESS NOTES
[facility]    Etta Aviles AGPCNP-BC  [facility] [place] [facility] [address] [place] [address]  [phone]          PATIENT NAME: Megan Trotter  : 1965  DATE: 25  MRN: [id number]          Reason for Visit        Chief Complaint   Patient presents with    Foot Pain     Pt presents to clinic with foot pain to left foot. Pt states that a few weeks ago she stumped her great toe on some furniture and it was fine but Friday her foot started swelling and hurting and it is red. Pt states she can hardly touch it and it hurts to walk on it she has had gout before in her great toe but never in the top of her foot        History of Present Illness      History of Present Illness    HPI:  Patient presents with worsening foot pain and swelling after injuring her great toe two weeks ago. Patient reports injuring her great toe approximately 2 weeks ago while attempting to move furniture. She initially bumped her foot, specifically her great toe. When she later tried to cut her toenail, she noticed blood oozing from it, leading her to believe she might lose the toenail. The pain has been progressively worsening since the incident. On Friday, she had a significant increase in pain and swelling, which she describes as potentially feeling like something had moved. The pain is now widespread in her foot, affecting all areas. She also notes that her foot feels feverish.    Patient has a history of gout, which typically only affects her great toe. She states that the current swelling is unlike her usual gout flares, with more extensive swelling than usual. She takes hydrochlorothiazide, which she was informed during this visit can contribute to gout.    Patient has not tried any interventions such as ice for relief. She also reports a significant decrease in appetite, with reduced food intake overall, including ice.    She was evaluated by her PCP,  Rehana Ferris, a few weeks ago. At that time, her blood pressure was recorded as 102/60-something.    Patient denies chest pain, shortness of breath, and swelling in feet, legs, or ankles other than the affected foot. She denies eating anything that could contribute to a gout flare.    MEDICATIONS:  Patient is on Hydrochlorothiazide for blood pressure management and Mounjaro 15 mg, likely for diabetes management. She has discontinued simvastatin due to her refusal.    MEDICAL HISTORY:  Patient has a history of gout and hypertension.          MEDICAL / SURGICAL / SOCIAL HISTORY     Past Medical History:   Diagnosis Date    Diabetes mellitus, type 2     Diabetic eye exam 2023    Dr. Sha Aviles - [facility]    Hyperlipidemia        Past Surgical History:   Procedure Laterality Date    breast biopsy      BREAST BIOPSY       SECTION      exploratory laprascopic      HERNIA REPAIR      HYSTERECTOMY         Social History[1]      I personally reviewed all past medical, surgical, and social.     MEDICATIONS / ALLERGIES / HM     Current Medications[2]    Review of patient's allergies indicates:   Allergen Reactions    Pcn [penicillins]        Immunization History   Administered Date(s) Administered    COVID-19, MRNA, LN-S, PF (MODERNA FULL 0.5 ML DOSE) 2021    COVID-19, MRNA, LN-S, PF (Pfizer) (Gray Cap) 2021        Health Maintenance   Topic Date Due    Hepatitis C Screening  Never done    HIV Screening  Never done    TETANUS VACCINE  Never done    Shingles Vaccine (1 of 2) Never done    Pneumococcal Vaccines (Age 50+) (1 of 1 - PCV) Never done    RSV Vaccine (Age 60+ and Pregnant patients) (1 - Risk 60-74 years 1-dose series) Never done    Influenza Vaccine (1) Never done    COVID-19 Vaccine (3 -  season) 2024    Colorectal Cancer Screening  2025    Mammogram  2025    Lipid Panel  2030        Physical Exam      Vital Signs  Temp: 97.7 °F  "(36.5 °C)  Temp Source: Oral  Pulse: 60  Resp: 18  SpO2: 100 %  BP: (!) 96/59  Pain Score:   8  Pain Loc: Foot  Height and Weight  Height: 5' 9" (175.3 cm)  Weight: 79.1 kg (174 lb 6.4 oz)  BSA (Calculated - sq m): 1.96 sq meters  BMI (Calculated): 25.7  Weight in (lb) to have BMI = 25: 168.9]    Physical Exam       Laboratory:    Lab Results   Component Value Date    GLU 95 11/16/2023     11/16/2023    K 4.0 11/16/2023     11/16/2023    CO2 32 11/16/2023    BUN 13 11/16/2023    CREATININE 1.10 (H) 11/16/2023    CALCIUM 9.8 11/16/2023    PROT 7.0 11/16/2023    ALBUMIN 3.8 11/16/2023    BILITOT 0.9 11/16/2023    ALKPHOS 59 11/16/2023    AST 11 (L) 11/16/2023    ALT 23 11/16/2023    ANIONGAP 6 (L) 11/16/2023    EGFRNONAA 50 (L) 05/16/2022       Lab Results   Component Value Date    WBC 5.37 08/27/2021    RBC 5.19 08/27/2021    HGB 14.6 08/27/2021    HCT 44.4 08/27/2021    MCV 85.5 08/27/2021    RDW 13.0 08/27/2021     08/27/2021        Lab Results   Component Value Date    CHOL 139 11/16/2023    TRIG 65 11/16/2023    HDL 73 (H) 11/16/2023    LDLCALC 53 11/16/2023       No results found for: "TSH"    Lab Results   Component Value Date    HGBA1C 4.6 04/03/2025    ESTIMATEDAVG 85 04/03/2025        No results found for: "TDNLHUXI34"    No results found for: "MBETWUPD96JO"      Point Of Care Testing:    No results found for: "WBCUR", "NITRITE", "UROBILINOGEN", "PROTEINPOC", "PHUR", "BLOODUR", "SPECGRAV", "KETONESU", "BILIRUBINPOC", "GLUCOSEUR"    No results found for: "[id number]", "FLUAMOLEC", "FLUBMOLEC", "MOLSTREPAPOC"    Assessment/Plan     Foot pain, left  -     X-Ray Foot Complete 3 view Left; Future; Expected date: 04/14/2025  -     Cancel: Uric Acid; Future; Expected date: 04/14/2025    Rest your foot.   Place an ice pack or a bag of frozen vegetables wrapped in a towel over the painful part. Never put ice right on the skin. Use ice every 1 to 2 hours for 10 to 15 minutes at a time.   Wrap your " foot with an elastic bandage to help with swelling.  Prop your foot on pillows, keeping it raised above the level of your heart. This may help lessen pain and swelling.  Slowly begin to stretch your foot when swelling and pain improves.     Assessment & Plan      IMPRESSION:  - Considered gout as potential cause of foot pain and swelling, noting history of gout and use of HCTZ.  - Determined conservative management with OTC pain relief and home care measures appropriate for initial treatment.  - Patient agreed to have x-ray of left foot; declined uric acid level today.       LEFT FOOT PAIN:  - Noted unusual swelling, possibly related to gout, which typically only affects the joint more so than whole foot in general.   - Instructed the patient to ice and elevate the affected foot area.  - Recommended Aleve (naproxen) 2 tablets twice daily for several days for pain relief.  - Ordered X-ray of the left foot to evaluate for gout crystals and arthritis.  - Suggested checking uric acid levels, but the patient declined.  - Explained to the patient that hydrochlorothiazide can contribute to gout.      FOLLOW-UP:  - Advised the patient to follow up with Dr. Karin Ferris if pain persists, worsens, or new symptoms develop.          Future Appointments   Date Time Provider Department Center   9/22/2025  8:40 AM [facility]       Workup results were reviewed and all questions were answered. Diagnosis and treatment options were discussed and the patient  is amenable with the overall treatment plan. Verbal and written discharge instructions were given including to return to clinic/ED with any acute worsening of symptoms or failure of symptoms to improve. The reasons for return to the clinic/ED were explained in lay terms. No further intervention is warranted at this time. The patient agrees with the plan, expresses understanding, is hemodynamically stable and in no acute distress.     All questions  answered to desired level of satisfaction    This note was generated with the assistance of ambient listening technology. Verbal consent was obtained by the patient and accompanying visitor(s) for the recording of patient appointment to facilitate this note. I attest to having reviewed and edited the generated note for accuracy, though some syntax or spelling errors may persist. Please contact the author of this note for any clarification.             Etta Aviles, AUBREYNP-BC [facility]          [1]   Social History  Tobacco Use    Smoking status: Former    Smokeless tobacco: Never   Substance Use Topics    Alcohol use: Not Currently    Drug use: Never   [2]   Current Outpatient Medications   Medication Sig Dispense Refill    blood sugar diagnostic Strp by Other route.      blood-glucose meter (ACCU-CHEK GUIDE GLUCOSE METER) Misc USE TO CHECK BLOOD SUGAR AS DIRECTED      hydroCHLOROthiazide (HYDRODIURIL) 12.5 MG Tab TAKE 1 TABLET DAILY 90 tablet 3    tirzepatide (MOUNJARO) 15 mg/0.5 mL PnIj Inject 15 mg into the skin every 7 days. 12 Pen 3     No current facility-administered medications for this visit.